# Patient Record
Sex: FEMALE | URBAN - METROPOLITAN AREA
[De-identification: names, ages, dates, MRNs, and addresses within clinical notes are randomized per-mention and may not be internally consistent; named-entity substitution may affect disease eponyms.]

---

## 2023-01-09 ENCOUNTER — OFFICE VISIT (OUTPATIENT)
Dept: ORTHOPEDIC SURGERY | Age: 58
End: 2023-01-09
Payer: COMMERCIAL

## 2023-01-09 ENCOUNTER — HOSPITAL ENCOUNTER (OUTPATIENT)
Dept: ORTHOPEDIC SURGERY | Age: 58
Discharge: HOME | End: 2023-01-11
Payer: COMMERCIAL

## 2023-01-09 VITALS
WEIGHT: 186.2 LBS | BODY MASS INDEX: 37.54 KG/M2 | HEART RATE: 89 BPM | HEIGHT: 59 IN | OXYGEN SATURATION: 97 % | TEMPERATURE: 96.4 F

## 2023-01-09 DIAGNOSIS — M54.42 ACUTE BILATERAL LOW BACK PAIN WITH BILATERAL SCIATICA: Primary | ICD-10-CM

## 2023-01-09 DIAGNOSIS — M54.41 ACUTE BILATERAL LOW BACK PAIN WITH BILATERAL SCIATICA: Primary | ICD-10-CM

## 2023-01-09 DIAGNOSIS — M54.41 ACUTE BILATERAL LOW BACK PAIN WITH BILATERAL SCIATICA: ICD-10-CM

## 2023-01-09 DIAGNOSIS — M54.42 ACUTE BILATERAL LOW BACK PAIN WITH BILATERAL SCIATICA: ICD-10-CM

## 2023-01-09 PROCEDURE — 99203 OFFICE O/P NEW LOW 30 MIN: CPT | Performed by: ORTHOPAEDIC SURGERY

## 2023-01-09 PROCEDURE — 72110 X-RAY EXAM L-2 SPINE 4/>VWS: CPT

## 2023-01-09 RX ORDER — NAPROXEN 500 MG/1
TABLET ORAL
COMMUNITY
Start: 2022-10-12

## 2023-01-09 RX ORDER — LOSARTAN POTASSIUM 100 MG/1
TABLET ORAL
COMMUNITY
Start: 2022-12-10

## 2023-01-09 RX ORDER — CYCLOBENZAPRINE HCL 10 MG
TABLET ORAL
COMMUNITY
Start: 2023-01-06

## 2023-01-09 RX ORDER — AMLODIPINE BESYLATE 10 MG/1
10 TABLET ORAL
COMMUNITY
Start: 2022-12-10

## 2023-01-09 RX ORDER — LIDOCAINE 50 MG/G
PATCH TOPICAL
COMMUNITY
Start: 2022-11-14

## 2023-01-09 NOTE — PROGRESS NOTES
Subjective:      Patient ID: Cecilio Butler is a 59 y.o. female who presents today for:  Chief Complaint   Patient presents with    New Patient     Patient presents for lower back pain x 1 year. She had an injury at her job. 10/03/2021       HPI  The patient is a known patient to my prior practice. She is here today with continued low back pain with bilateral radicular symptoms. This is a Worker's Compensation case. She would like me to obtain the MRI which she had done first and she thinks 2017 possibly I told her that this is possibly at her old office however if it is not there she will need to go get it. Her last MRI was 1/3/2022. She will need a new MRI of her lumbar spine. She began but was not able to complete physical therapy on December 29, 2022. She describes the pain down the back of the thighs to the back of the calf. It hurts more when she is walking. She has not had any type of therapy such as injection or surgical intervention at this time. No past medical history on file. Past Surgical History:   Procedure Laterality Date    HYSTEROTOMY         Tobacco Use      Smoking status: Never      Smokeless tobacco: Never     reports no history of drug use. Allergies   Allergen Reactions    Oxycodone-Acetaminophen Itching         Objective:   Pulse 89   Temp (!) 96.4 °F (35.8 °C) (Tympanic)   Ht 4' 11\" (1.499 m)   Wt 186 lb 3.2 oz (84.5 kg)   SpO2 97%   BMI 37.61 kg/m²     Physical Exam :  She can rise up on her toes and rise up on her heels. She has 5 out of 5 hip strength and knee extension strength bilaterally. No sensory deficits noted in her bilateral lower extremities. Radiographs and Laboratory Studies:     Diagnostic Imaging Studies:    4 views of her lumbar spine were taken today showing an L4-5 spondylolisthesis. She does have significant movement at the L4-5 interspace along with significant degenerative disc disease.   Reviewed her old MRI which shows moderate central stenosis as well as right-sided foraminal stenosis greater than left-sided foraminal stenosis on her MRI views. Old MRI also shows severe fluid within the facets bilaterally at L4-5 as a sign of hypermobility. Assessment:       Diagnosis Orders   1. Acute bilateral low back pain with bilateral sciatica  XR LUMBAR SPINE (MIN 4 VIEWS)            Plan:      I need to order an MRI of her lumbar spine without contrast as her old one is over a-year-old. I will see her back after the MRI has been completed and we can determine injection of therapy which would be bilateral L4-5 facet injections as well as a caudal epidural steroid block. Orders Placed This Encounter   Procedures    XR LUMBAR SPINE (MIN 4 VIEWS)     Standing Status:   Future     Number of Occurrences:   1     Standing Expiration Date:   1/9/2024     Scheduling Instructions:      AP, lateral and lateral flexion/extension     Order Specific Question:   Reason for exam:     Answer:   Low back pain     No orders of the defined types were placed in this encounter. Return in about 2 weeks (around 1/23/2023).       Quintin Christine, DO

## 2023-01-23 ENCOUNTER — TELEPHONE (OUTPATIENT)
Dept: ORTHOPEDIC SURGERY | Age: 58
End: 2023-01-23

## 2023-01-23 ENCOUNTER — OFFICE VISIT (OUTPATIENT)
Dept: ORTHOPEDIC SURGERY | Age: 58
End: 2023-01-23

## 2023-01-23 VITALS
BODY MASS INDEX: 37.5 KG/M2 | OXYGEN SATURATION: 97 % | HEART RATE: 78 BPM | TEMPERATURE: 97.3 F | HEIGHT: 59 IN | WEIGHT: 186 LBS

## 2023-01-23 DIAGNOSIS — M54.41 ACUTE BILATERAL LOW BACK PAIN WITH BILATERAL SCIATICA: Primary | ICD-10-CM

## 2023-01-23 DIAGNOSIS — M54.42 ACUTE BILATERAL LOW BACK PAIN WITH BILATERAL SCIATICA: Primary | ICD-10-CM

## 2023-01-23 NOTE — TELEPHONE ENCOUNTER
Peer to Peer with Dr. Roly Moody for MRI.  Authorization # [id number]  Valid dates: 1/1/2023-03/12/2023 01/23/2023: Called Patient, left voice message to call and reschedule approved MRI

## 2023-01-23 NOTE — PROGRESS NOTES
Subjective:      Patient ID: Sonia Angulo is a 59 y.o. female who presents today for:  Chief Complaint   Patient presents with    Follow-up     Pt states pain in lower back and legs. MRI pending       HPI  The patient is a known patient to my prior practice. She is here today with continued low back pain with bilateral radicular symptoms. This is a Worker's Compensation case. She would like me to obtain the MRI which she had done first and she thinks 2017 possibly I told her that this is possibly at her old office however if it is not there she will need to go get it. Her last MRI was 1/3/2022. She will need a new MRI of her lumbar spine. She began but was not able to complete physical therapy on December 29, 2022. She describes the pain down the back of the thighs to the back of the calf. It hurts more when she is walking. She has not had any type of therapy such as injection or surgical intervention at this time. No past medical history on file. Past Surgical History:   Procedure Laterality Date    HYSTEROTOMY         Tobacco Use      Smoking status: Never      Smokeless tobacco: Never     reports no history of drug use. Allergies   Allergen Reactions    Oxycodone-Acetaminophen Itching         Objective:   Pulse 78   Temp 97.3 °F (36.3 °C) (Temporal)   Ht 4' 11\" (1.499 m)   Wt 186 lb (84.4 kg)   SpO2 97%   BMI 37.57 kg/m²     Physical Exam :  She can rise up on her toes and rise up on her heels. She has 5 out of 5 hip strength and knee extension strength bilaterally. No sensory deficits noted in her bilateral lower extremities. Radiographs and Laboratory Studies:     Diagnostic Imaging Studies:    4 views of her lumbar spine were taken today showing an L4-5 spondylolisthesis. She does have significant movement at the L4-5 interspace along with significant degenerative disc disease.   Reviewed her old MRI which shows moderate central stenosis as well as right-sided foraminal stenosis greater than left-sided foraminal stenosis on her MRI views. Old MRI also shows severe fluid within the facets bilaterally at L4-5 as a sign of hypermobility. Assessment:       Diagnosis Orders   1. Acute bilateral low back pain with bilateral sciatica                Plan:      I need to order an MRI of her lumbar spine without contrast as her old one is over a-year-old. I believe that we are in the process of scheduling a peer to peer. I will see her back after the MRI has been completed and we can determine injection of therapy which would be bilateral L4-5 facet injections as well as a caudal epidural steroid block. At some point I believe that she will be in need of a L4-5 spinal fusion. However she would like to try to exhaust all nonoperative measures prior to proceeding to surgery. She does smoke daily and they recommend that she quit smoking prior to surgery so as to increase her chances of fusion and reduce her chances of postoperative complications. She would like to switch her physician of record for this Worker's Compensation case.         Nat Melchor, DO

## 2023-02-06 ENCOUNTER — HOSPITAL ENCOUNTER (OUTPATIENT)
Dept: MRI IMAGING | Age: 58
Discharge: HOME | End: 2023-02-08
Payer: COMMERCIAL

## 2023-02-06 DIAGNOSIS — M54.42 ACUTE BILATERAL LOW BACK PAIN WITH BILATERAL SCIATICA: ICD-10-CM

## 2023-02-06 DIAGNOSIS — M54.41 ACUTE BILATERAL LOW BACK PAIN WITH BILATERAL SCIATICA: ICD-10-CM

## 2023-02-06 PROCEDURE — 72148 MRI LUMBAR SPINE W/O DYE: CPT

## 2023-02-28 ENCOUNTER — OFFICE VISIT (OUTPATIENT)
Dept: ORTHOPEDIC SURGERY | Age: 58
End: 2023-02-28

## 2023-02-28 VITALS
TEMPERATURE: 97 F | OXYGEN SATURATION: 97 % | HEIGHT: 59 IN | BODY MASS INDEX: 36.29 KG/M2 | HEART RATE: 85 BPM | WEIGHT: 180 LBS

## 2023-02-28 DIAGNOSIS — M43.16 SPONDYLOLISTHESIS AT L4-L5 LEVEL: Primary | ICD-10-CM

## 2023-02-28 RX ORDER — VARENICLINE TARTRATE 25 MG
KIT ORAL
COMMUNITY
Start: 2023-01-26

## 2023-02-28 RX ORDER — DICLOFENAC SODIUM 75 MG/1
TABLET, DELAYED RELEASE ORAL
COMMUNITY
Start: 2023-01-25

## 2023-02-28 NOTE — PROGRESS NOTES
Subjective:      Patient ID: Mia Sepulveda is a 59 y.o. female who presents today for:  Chief Complaint   Patient presents with    Follow-up     Pt states here for MRI results. Pt states still having low back pain. HPI  The patient is a known patient to my prior practice. She is here today with continued low back pain with bilateral radicular symptoms. This is a Worker's Compensation case. She began but was not able to complete physical therapy on December 29, 2022. She describes the pain down the back of the thighs to the back of the calf. It hurts more when she is walking. She has not had any type of therapy such as injection or surgical intervention at this time. No past medical history on file. Past Surgical History:   Procedure Laterality Date    HYSTEROTOMY         Tobacco Use      Smoking status: Never      Smokeless tobacco: Never     reports no history of drug use. Allergies   Allergen Reactions    Oxycodone-Acetaminophen Itching         Objective:   Pulse 85   Temp 97 °F (36.1 °C) (Temporal)   Ht 4' 11\" (1.499 m)   Wt 180 lb (81.6 kg)   SpO2 97%   BMI 36.36 kg/m²     Physical Exam :  She can rise up on her toes and rise up on her heels. She has 5 out of 5 hip strength and knee extension strength bilaterally. No sensory deficits noted in her bilateral lower extremities. Radiographs and Laboratory Studies:     Diagnostic Imaging Studies:    4 views of her lumbar spine were taken today showing an L4-5 spondylolisthesis. She does have significant movement at the L4-5 interspace along with significant degenerative disc disease. Reviewed her old MRI which shows moderate central stenosis as well as right-sided foraminal stenosis greater than left-sided foraminal stenosis on her MRI views. Old MRI also shows severe fluid within the facets bilaterally at L4-5 as a sign of hypermobility.   New I would like her to continue with physical therapy and [facility] MRI of her lumbar spine was reviewed showing moderate to severe central spinal stenosis at L4-5. She also has bilateral L4-5 facet arthropathy. Assessment:       Diagnosis Orders   1. Spondylolisthesis at L4-L5 level                  Plan:      I would like her to continue physical therapy and chiropractic modalities and [facility]. Today we talked about quitting smoking. We also discussed injections at L4-5 bilateral facets along with a caudal epidural steroid block. She will think about the information that I have given her and let me know if she wants to proceed with any type of injection therapy in the future. I am happy to see her back on an as-needed basis.         Tanya Fields, DO

## 2023-07-26 ENCOUNTER — TELEPHONE (OUTPATIENT)
Dept: ORTHOPEDIC SURGERY | Age: 58
End: 2023-07-26

## 2023-09-18 DIAGNOSIS — M43.16 SPONDYLOLISTHESIS AT L4-L5 LEVEL: Primary | ICD-10-CM

## 2023-09-27 ENCOUNTER — INITIAL CONSULT (OUTPATIENT)
Dept: PAIN MANAGEMENT | Age: 58
End: 2023-09-27
Payer: COMMERCIAL

## 2023-09-27 VITALS
SYSTOLIC BLOOD PRESSURE: 138 MMHG | HEART RATE: 87 BPM | OXYGEN SATURATION: 96 % | HEIGHT: 59 IN | WEIGHT: 180 LBS | TEMPERATURE: 97.4 F | DIASTOLIC BLOOD PRESSURE: 86 MMHG | BODY MASS INDEX: 36.29 KG/M2

## 2023-09-27 DIAGNOSIS — M54.16 LUMBAR RADICULOPATHY: Primary | ICD-10-CM

## 2023-09-27 PROCEDURE — 99203 OFFICE O/P NEW LOW 30 MIN: CPT | Performed by: PAIN MEDICINE

## 2023-09-27 PROCEDURE — 1036F TOBACCO NON-USER: CPT | Performed by: PAIN MEDICINE

## 2023-09-27 PROCEDURE — G8427 DOCREV CUR MEDS BY ELIG CLIN: HCPCS | Performed by: PAIN MEDICINE

## 2023-09-27 PROCEDURE — 3017F COLORECTAL CA SCREEN DOC REV: CPT | Performed by: PAIN MEDICINE

## 2023-09-27 PROCEDURE — G8417 CALC BMI ABV UP PARAM F/U: HCPCS | Performed by: PAIN MEDICINE

## 2023-09-27 ASSESSMENT — ENCOUNTER SYMPTOMS
EYES NEGATIVE: 1
ALLERGIC/IMMUNOLOGIC NEGATIVE: 1
GASTROINTESTINAL NEGATIVE: 1
RESPIRATORY NEGATIVE: 1

## 2023-09-27 NOTE — PROGRESS NOTES
History of Present Illness     Patient Identification  Shira Avalos is a 59 y.o. female. Patient information was obtained from patient. Chief Complaint   Chief Complaint   Patient presents with    Consultation    Back Pain    Leg Pain       Patient presents with complaint of back pain. 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 Onset of pain was 2 years ago and has been unchanged since. The pain is located in diffuse lower back, described as aching and sharp and rated as mild, moderate, and 7-8 / 10, with radiation to both ankles mostly left. Symptoms include weakness 3 weeks ago almost fell. The patient also Denied complains of fever, dysuria, weight loss, history of cancer, history of osteoporosis, history of steroid use, obesity. The patient denies numbness, incontinence. The patient denies other injuries. Care prior to arrival consisted of ice, heat, and PT  in 3 months ago , Chiropractor 2021 till 3 months ago with moderate relief. Seen Surgeon advised to quit smoking loose weight and surgery. MRI : IMPRESSION:  1. No fracture or bony destructive lesion. 2. Grade 1 anterolisthesis of L4 over L5.  3. Moderate central canal stenosis at L4-5, with moderate to severe lateral recess and neural foraminal stenoses. 4. Fluid in the facet joints at L4-5 may be due to arthrosis or instability. History reviewed. No pertinent past medical history. History reviewed. No pertinent family history. Current Outpatient Medications   Medication Sig Dispense Refill    sertraline (ZOLOFT) 50 MG tablet 2 tablets      amLODIPine (NORVASC) 10 MG tablet 1 tablet      losartan (COZAAR) 100 MG tablet take 1 tablet by mouth once daily      lidocaine (LIDODERM) 5 % apply 1 patch ON back once daily 12 hours ON and 12 hours off      cyclobenzaprine (FLEXERIL) 10 MG tablet       naproxen (NAPROSYN) 500 MG tablet        No current facility-administered medications for this visit.      Allergies

## 2023-09-28 ENCOUNTER — TELEPHONE (OUTPATIENT)
Dept: PAIN MANAGEMENT | Age: 58
End: 2023-09-28

## 2023-09-28 NOTE — TELEPHONE ENCOUNTER
OV NOTE STATES PATIENT HAS HAD PT AND CHIROPRACTIC THERAPY.     PLEASE ADVISE PATIENT WE ARE NEEDING RECORDS TO SUBMIT TO INSURANCE

## 2023-10-06 NOTE — TELEPHONE ENCOUNTER
Release of Information form mailed to patient after talking to her. She will get the records faxed to us.

## 2023-12-16 ENCOUNTER — APPOINTMENT (OUTPATIENT)
Dept: RADIOLOGY | Facility: HOSPITAL | Age: 58
End: 2023-12-16
Payer: COMMERCIAL

## 2023-12-16 ENCOUNTER — HOSPITAL ENCOUNTER (EMERGENCY)
Facility: HOSPITAL | Age: 58
Discharge: HOME | End: 2023-12-16
Attending: EMERGENCY MEDICINE
Payer: COMMERCIAL

## 2023-12-16 VITALS
OXYGEN SATURATION: 96 % | TEMPERATURE: 98.4 F | SYSTOLIC BLOOD PRESSURE: 175 MMHG | DIASTOLIC BLOOD PRESSURE: 102 MMHG | HEART RATE: 94 BPM | RESPIRATION RATE: 18 BRPM

## 2023-12-16 DIAGNOSIS — M79.671 ACUTE PAIN OF RIGHT FOOT: Primary | ICD-10-CM

## 2023-12-16 DIAGNOSIS — I10 HYPERTENSION, UNSPECIFIED TYPE: ICD-10-CM

## 2023-12-16 PROCEDURE — 99284 EMERGENCY DEPT VISIT MOD MDM: CPT | Performed by: EMERGENCY MEDICINE

## 2023-12-16 PROCEDURE — 73630 X-RAY EXAM OF FOOT: CPT | Mod: RT,FY

## 2023-12-16 PROCEDURE — 2500000001 HC RX 250 WO HCPCS SELF ADMINISTERED DRUGS (ALT 637 FOR MEDICARE OP): Performed by: EMERGENCY MEDICINE

## 2023-12-16 PROCEDURE — 73630 X-RAY EXAM OF FOOT: CPT | Mod: RIGHT SIDE | Performed by: RADIOLOGY

## 2023-12-16 PROCEDURE — 99283 EMERGENCY DEPT VISIT LOW MDM: CPT | Performed by: EMERGENCY MEDICINE

## 2023-12-16 RX ORDER — AMLODIPINE BESYLATE 5 MG/1
10 TABLET ORAL DAILY
Status: DISCONTINUED | OUTPATIENT
Start: 2023-12-16 | End: 2023-12-16 | Stop reason: HOSPADM

## 2023-12-16 RX ORDER — LOSARTAN POTASSIUM 50 MG/1
50 TABLET ORAL ONCE
Status: COMPLETED | OUTPATIENT
Start: 2023-12-16 | End: 2023-12-16

## 2023-12-16 RX ORDER — ACETAMINOPHEN 325 MG/1
975 TABLET ORAL ONCE
Status: COMPLETED | OUTPATIENT
Start: 2023-12-16 | End: 2023-12-16

## 2023-12-16 RX ORDER — IBUPROFEN 600 MG/1
600 TABLET ORAL ONCE
Status: COMPLETED | OUTPATIENT
Start: 2023-12-16 | End: 2023-12-16

## 2023-12-16 RX ADMIN — ACETAMINOPHEN 975 MG: 325 TABLET ORAL at 12:54

## 2023-12-16 RX ADMIN — IBUPROFEN 600 MG: 600 TABLET, FILM COATED ORAL at 12:54

## 2023-12-16 RX ADMIN — AMLODIPINE BESYLATE 10 MG: 5 TABLET ORAL at 13:13

## 2023-12-16 RX ADMIN — LOSARTAN POTASSIUM 50 MG: 50 TABLET, FILM COATED ORAL at 12:54

## 2023-12-16 ASSESSMENT — COLUMBIA-SUICIDE SEVERITY RATING SCALE - C-SSRS
6. HAVE YOU EVER DONE ANYTHING, STARTED TO DO ANYTHING, OR PREPARED TO DO ANYTHING TO END YOUR LIFE?: NO
1. IN THE PAST MONTH, HAVE YOU WISHED YOU WERE DEAD OR WISHED YOU COULD GO TO SLEEP AND NOT WAKE UP?: NO
2. HAVE YOU ACTUALLY HAD ANY THOUGHTS OF KILLING YOURSELF?: NO

## 2023-12-16 ASSESSMENT — PAIN SCALES - GENERAL: PAINLEVEL_OUTOF10: 10 - WORST POSSIBLE PAIN

## 2023-12-16 ASSESSMENT — PAIN - FUNCTIONAL ASSESSMENT: PAIN_FUNCTIONAL_ASSESSMENT: 0-10

## 2023-12-16 NOTE — Clinical Note
Crystal Carter was seen and treated in our emergency department on 12/16/2023.  She may return to work on 12/19/2023.       If you have any questions or concerns, please don't hesitate to call.      Kvng Bob MD

## 2023-12-16 NOTE — DISCHARGE INSTRUCTIONS
Please follow-up with your primary care physician regarding high blood pressure and right foot pain.  May take Tylenol or Motrin as needed for foot pain.  Elevate foot when possible to help decrease swelling.  Return to emergency department for reassessment if new or worsening symptoms.

## 2023-12-16 NOTE — Clinical Note
Crystal Carter was seen and treated in our emergency department on 12/16/2023.  She may return to work on 12/17/2023.       If you have any questions or concerns, please don't hesitate to call.      Kvng Bob MD

## 2023-12-16 NOTE — Clinical Note
Crystal Carter was seen and treated in our emergency department on 12/16/2023.  She may return to work on 12/18/2023.       If you have any questions or concerns, please don't hesitate to call.      Kvng Bob MD

## 2023-12-16 NOTE — ED PROVIDER NOTES
Chief Complaint   Patient presents with    Foot Injury     Dropped a can on right foot     History of Present Illness   58-year-old presents for evaluation of right foot pain.  She dropped a metal can of peaches on her foot earlier this morning.  Has not taken any pain medication prior to arrival.  States she is not able to bear weight on the foot.  No ankle pain.  History of hypertension and has not taken her antihypertensive medication for the past few days.  States that she is prescribed losartan and amlodipine.    Physical Exam     ED Triage Vitals [12/16/23 1232]   Temp Heart Rate Resp BP   36.9 °C (98.4 °F) 94 18 (!) 175/102      SpO2 Temp Source Heart Rate Source Patient Position   96 % Tympanic Monitor --      BP Location FiO2 (%)     -- --         General: Well-appearing.  Head: Atraumatic.  Eyes: No conjunctival injection.   Ears Nose Throat: Hearing grossly intact. No epistaxis. Oral mucosa moist.  Neck: Supple.  Cardiovascular: Extremities warm.  2+ dorsalis pedis pulse right foot.  Pulmonary: No stridor. Normal respiratory effort.  Abdominal: No abdominal distention.  Musculoskeletal: No deformity or joint swelling.  No significant soft tissue swelling of the right foot or ankle.  Right midfoot tenderness.  No tenderness of the calcaneus or fifth metatarsal.  Skin: No rash.  Psychiatric: Does not appear to respond to internal stimuli.  Neurologic: Alert. Follows directions. Face symmetric. No aphasia or dysarthria. Symmetric movement of upper and lower extremities.    ED Course & Medical Decision Making   Blood pressure elevated 175/102 at triage otherwise not vital signs within normal limits.  Exam notable for right foot tenderness without deformity or associated soft tissue swelling or bruising.  X-ray demonstrated no fracture.  Patient given home dose of amlodipine and losartan in addition to Tylenol and Motrin.  She reported improvement in symptoms but felt that she could not ambulate on the foot  comfortably due to pain.  Recommended to use crutches and elevate the foot when possible and advised Tylenol and Motrin as needed for pain.  Advise follow-up with primary care physician for management of elevated blood pressure and foot pain.  Return precautions reviewed and discharged in good condition.    Diagnoses as of 12/16/23 1443   Acute pain of right foot   Hypertension, unspecified type            Kvng Bob MD  12/16/23 1444